# Patient Record
Sex: FEMALE | Race: WHITE | NOT HISPANIC OR LATINO | URBAN - METROPOLITAN AREA
[De-identification: names, ages, dates, MRNs, and addresses within clinical notes are randomized per-mention and may not be internally consistent; named-entity substitution may affect disease eponyms.]

---

## 2021-05-17 ENCOUNTER — OFFICE VISIT (OUTPATIENT)
Dept: URBAN - METROPOLITAN AREA CLINIC 60 | Facility: CLINIC | Age: 86
End: 2021-05-17

## 2021-06-17 ENCOUNTER — OFFICE VISIT (OUTPATIENT)
Dept: URBAN - METROPOLITAN AREA TELEHEALTH 2 | Facility: TELEHEALTH | Age: 86
End: 2021-06-17

## 2022-07-09 ENCOUNTER — TELEPHONE ENCOUNTER (OUTPATIENT)
Dept: URBAN - METROPOLITAN AREA CLINIC 121 | Facility: CLINIC | Age: 87
End: 2022-07-09

## 2022-07-09 RX ORDER — PROPAFENONE HYDROCHLORIDE 225 MG/1
TABLET, FILM COATED ORAL THREE TIMES A DAY
Refills: 0 | OUTPATIENT
Start: 2018-01-31 | End: 2019-12-10

## 2022-07-09 RX ORDER — PROPAFENONE HYDROCHLORIDE 225 MG/1
TABLET, FILM COATED ORAL THREE TIMES A DAY
Refills: 0 | OUTPATIENT
Start: 2014-01-16 | End: 2016-01-22

## 2022-07-09 RX ORDER — PROPAFENONE HYDROCHLORIDE 225 MG/1
TABLET, FILM COATED ORAL THREE TIMES A DAY
Refills: 0 | OUTPATIENT
Start: 2016-01-22 | End: 2016-02-25

## 2022-07-09 RX ORDER — LEVOTHYROXINE SODIUM 150 UG/1
TABLET ORAL
Refills: 0 | OUTPATIENT
Start: 2020-01-17 | End: 2020-01-17

## 2022-07-09 RX ORDER — PROPAFENONE HYDROCHLORIDE 225 MG/1
TABLET, FILM COATED ORAL THREE TIMES A DAY
Refills: 0 | OUTPATIENT
Start: 2020-02-27 | End: 2020-03-19

## 2022-07-09 RX ORDER — ASPIRIN 81 MG/1
TABLET, CHEWABLE ORAL
Refills: 0 | OUTPATIENT
Start: 2018-01-31 | End: 2020-01-17

## 2022-07-09 RX ORDER — FAMOTIDINE 40 MG/1
TABLET, FILM COATED ORAL
Refills: 0 | OUTPATIENT
Start: 2017-01-26 | End: 2017-12-13

## 2022-07-09 RX ORDER — IRBESARTAN 300 MG/1
TABLET ORAL ONCE A DAY
Refills: 0 | OUTPATIENT
Start: 2017-12-13 | End: 2018-01-31

## 2022-07-09 RX ORDER — LEVOTHYROXINE SODIUM 125 UG/1
TABLET ORAL ONCE A DAY
Refills: 0 | OUTPATIENT
Start: 2016-01-22 | End: 2016-02-25

## 2022-07-09 RX ORDER — AMLODIPINE BESYLATE 5 MG/1
TABLET ORAL ONCE A DAY
Refills: 0 | OUTPATIENT
Start: 2016-01-22 | End: 2016-02-25

## 2022-07-09 RX ORDER — LORAZEPAM 0.5 MG/1
PRN TABLET ORAL
Refills: 0 | OUTPATIENT
Start: 2020-01-17 | End: 2020-01-17

## 2022-07-09 RX ORDER — LEVOTHYROXINE SODIUM 137 UG/1
CAPSULE ORAL ONCE A DAY
Refills: 0 | OUTPATIENT
Start: 2020-02-27 | End: 2020-03-19

## 2022-07-09 RX ORDER — FAMOTIDINE 20 MG/1
TABLET ORAL
Refills: 0 | OUTPATIENT
Start: 2021-05-13 | End: 2021-05-17

## 2022-07-09 RX ORDER — ELECTROLYTES/DEXTROSE
SOLUTION, ORAL ORAL TWICE A DAY
Refills: 0 | OUTPATIENT
Start: 2018-01-31 | End: 2020-01-17

## 2022-07-09 RX ORDER — AMLODIPINE AND ATORVASTATIN 10; 10 MG/1; MG/1
TABLET, COATED ORAL
Refills: 0 | OUTPATIENT
Start: 2017-12-13 | End: 2018-01-31

## 2022-07-09 RX ORDER — LEVOTHYROXINE SODIUM 137 UG/1
CAPSULE ORAL
Refills: 0 | OUTPATIENT
Start: 2020-01-17 | End: 2020-02-27

## 2022-07-09 RX ORDER — FAMOTIDINE 40 MG/1
TABLET, FILM COATED ORAL
Refills: 0 | OUTPATIENT
Start: 2017-12-13 | End: 2018-01-31

## 2022-07-09 RX ORDER — LEVOTHYROXINE SODIUM 150 UG/1
TABLET ORAL
Refills: 0 | OUTPATIENT
Start: 2017-01-26 | End: 2017-12-01

## 2022-07-09 RX ORDER — FAMOTIDINE 20 MG
TABLET ORAL AS NEEDED
Refills: 0 | OUTPATIENT
Start: 2016-01-22 | End: 2016-02-25

## 2022-07-09 RX ORDER — AMLODIPINE BESYLATE 10 MG/1
TABLET ORAL ONCE A DAY
Refills: 0 | OUTPATIENT
Start: 2020-02-27 | End: 2020-03-19

## 2022-07-09 RX ORDER — CEPHALEXIN 500 MG/1
CAPSULE ORAL
Refills: 0 | OUTPATIENT
Start: 2021-05-13 | End: 2021-05-17

## 2022-07-09 RX ORDER — PROPAFENONE HYDROCHLORIDE 225 MG/1
TABLET, FILM COATED ORAL THREE TIMES A DAY
Refills: 0 | OUTPATIENT
Start: 2019-12-10 | End: 2020-01-17

## 2022-07-09 RX ORDER — FAMOTIDINE 40 MG/1
ONCE BEFORE DINNER TABLET, FILM COATED ORAL ONCE A DAY
Refills: 0 | OUTPATIENT
Start: 2017-01-26 | End: 2018-01-31

## 2022-07-09 RX ORDER — PROPAFENONE HYDROCHLORIDE 225 MG/1
TABLET, FILM COATED ORAL THREE TIMES A DAY
Refills: 0 | OUTPATIENT
Start: 2020-01-17 | End: 2020-02-27

## 2022-07-09 RX ORDER — MULTIVITAMIN
TABLET ORAL ONCE A DAY
Refills: 0 | OUTPATIENT
Start: 2016-01-22 | End: 2016-02-25

## 2022-07-09 RX ORDER — IRBESARTAN 300 MG/1
TABLET ORAL ONCE A DAY
Refills: 0 | OUTPATIENT
Start: 2016-01-22 | End: 2016-02-25

## 2022-07-09 RX ORDER — AMLODIPINE AND ATORVASTATIN 10; 10 MG/1; MG/1
TABLET, COATED ORAL
Refills: 0 | OUTPATIENT
Start: 2018-01-31 | End: 2019-12-10

## 2022-07-09 RX ORDER — LEVOTHYROXINE SODIUM 150 UG/1
TABLET ORAL
Refills: 0 | OUTPATIENT
Start: 2019-12-10 | End: 2020-01-17

## 2022-07-09 RX ORDER — OMEPRAZOLE 40 MG/1
TAKE ONE CAPSULE BY MOUTH ONCE A DAY CAPSULE, DELAYED RELEASE ORAL ONCE A DAY
Refills: 1 | OUTPATIENT
Start: 2020-01-17 | End: 2020-02-27

## 2022-07-09 RX ORDER — ASPIRIN 81 MG/1
TABLET, CHEWABLE ORAL
Refills: 0 | OUTPATIENT
Start: 2020-01-17 | End: 2020-02-27

## 2022-07-09 RX ORDER — METOPROLOL SUCCINATE 25 MG/1
TABLET, EXTENDED RELEASE ORAL
Refills: 0 | OUTPATIENT
Start: 2020-01-17 | End: 2020-01-17

## 2022-07-09 RX ORDER — LEVOTHYROXINE SODIUM 150 UG/1
TABLET ORAL
Refills: 0 | OUTPATIENT
Start: 2017-12-01 | End: 2018-01-31

## 2022-07-09 RX ORDER — AMLODIPINE BESYLATE 5 MG/1
TABLET ORAL ONCE A DAY
Refills: 0 | OUTPATIENT
Start: 2019-10-03 | End: 2020-01-17

## 2022-07-09 RX ORDER — LEVOTHYROXINE SODIUM 125 UG/1
TABLET ORAL ONCE A DAY
Refills: 0 | OUTPATIENT
Start: 2014-01-16 | End: 2016-01-22

## 2022-07-09 RX ORDER — PROPAFENONE HYDROCHLORIDE 225 MG/1
TABLET, FILM COATED ORAL THREE TIMES A DAY
Refills: 0 | OUTPATIENT
Start: 2020-03-19 | End: 2021-05-17

## 2022-07-09 RX ORDER — AMLODIPINE AND ATORVASTATIN 10; 10 MG/1; MG/1
TABLET, COATED ORAL
Refills: 0 | OUTPATIENT
Start: 2019-12-10 | End: 2020-01-17

## 2022-07-09 RX ORDER — AMLODIPINE BESYLATE 10 MG/1
TABLET ORAL ONCE A DAY
Refills: 0 | OUTPATIENT
Start: 2020-01-17 | End: 2020-02-27

## 2022-07-09 RX ORDER — FAMOTIDINE 20 MG
TABLET ORAL
Refills: 0 | OUTPATIENT
Start: 2018-01-31 | End: 2020-01-17

## 2022-07-09 RX ORDER — FAMOTIDINE 20 MG
ONCE A DAY TABLET ORAL ONCE A DAY
Refills: 1 | OUTPATIENT
Start: 2020-02-27 | End: 2020-04-23

## 2022-07-09 RX ORDER — AMLODIPINE AND ATORVASTATIN 10; 10 MG/1; MG/1
TABLET, COATED ORAL
Refills: 0 | OUTPATIENT
Start: 2017-01-26 | End: 2017-12-01

## 2022-07-09 RX ORDER — LEVOTHYROXINE SODIUM 150 UG/1
TABLET ORAL
Refills: 0 | OUTPATIENT
Start: 2018-01-31 | End: 2019-12-10

## 2022-07-09 RX ORDER — IRBESARTAN 300 MG/1
TABLET ORAL ONCE A DAY
Refills: 0 | OUTPATIENT
Start: 2018-01-31 | End: 2020-01-17

## 2022-07-09 RX ORDER — IRBESARTAN 300 MG/1
TABLET ORAL ONCE A DAY
Refills: 0 | OUTPATIENT
Start: 2020-01-17 | End: 2020-01-17

## 2022-07-09 RX ORDER — FAMOTIDINE 20 MG
TABLET ORAL AS NEEDED
Refills: 0 | OUTPATIENT
Start: 2014-01-16 | End: 2016-01-22

## 2022-07-09 RX ORDER — ASPIRIN 81 MG/1
TABLET, CHEWABLE ORAL
Refills: 0 | OUTPATIENT
Start: 2020-02-27 | End: 2020-03-19

## 2022-07-09 RX ORDER — FAMOTIDINE 20 MG/1
1 EVERY BEDTIME TABLET ORAL
Refills: 1 | OUTPATIENT
Start: 2021-05-17 | End: 2021-07-08

## 2022-07-09 RX ORDER — UBIDECARENONE/VIT E ACET 100MG-5
CAPSULE ORAL ONCE A DAY
Refills: 0 | OUTPATIENT
Start: 2016-01-22 | End: 2016-02-25

## 2022-07-09 RX ORDER — METOPROLOL SUCCINATE 25 MG/1
TABLET, EXTENDED RELEASE ORAL
Refills: 0 | OUTPATIENT
Start: 2019-12-10 | End: 2020-01-17

## 2022-07-09 RX ORDER — IRBESARTAN 300 MG/1
TABLET ORAL ONCE A DAY
Refills: 0 | OUTPATIENT
Start: 2017-12-01 | End: 2017-12-13

## 2022-07-09 RX ORDER — LORAZEPAM 0.5 MG/1
PRN TABLET ORAL
Refills: 0 | OUTPATIENT
Start: 2018-01-31 | End: 2020-01-17

## 2022-07-09 RX ORDER — AMLODIPINE BESYLATE 10 MG/1
TABLET ORAL ONCE A DAY
Refills: 0 | OUTPATIENT
Start: 2020-03-19 | End: 2021-05-17

## 2022-07-09 RX ORDER — FAMOTIDINE 20 MG
TABLET ORAL
Refills: 0 | OUTPATIENT
Start: 2020-01-17 | End: 2020-01-17

## 2022-07-09 RX ORDER — ASPIRIN 81 MG/1
TABLET, CHEWABLE ORAL
Refills: 0 | OUTPATIENT
Start: 2020-03-19 | End: 2021-05-17

## 2022-07-09 RX ORDER — ELECTROLYTES/DEXTROSE
SOLUTION, ORAL ORAL TWICE A DAY
Refills: 0 | OUTPATIENT
Start: 2020-01-17 | End: 2020-01-17

## 2022-07-09 RX ORDER — IRBESARTAN 300 MG/1
TABLET ORAL ONCE A DAY
Refills: 0 | OUTPATIENT
Start: 2014-01-16 | End: 2016-01-22

## 2022-07-09 RX ORDER — AMLODIPINE AND ATORVASTATIN 10; 10 MG/1; MG/1
TABLET, COATED ORAL
Refills: 0 | OUTPATIENT
Start: 2020-01-17 | End: 2020-01-17

## 2022-07-09 RX ORDER — AMLODIPINE AND ATORVASTATIN 10; 10 MG/1; MG/1
TABLET, COATED ORAL
Refills: 0 | OUTPATIENT
Start: 2017-12-01 | End: 2017-12-13

## 2022-07-09 RX ORDER — FAMOTIDINE 10 MG
TAKE ONE DAILY AS DIRECTED TABLET ORAL
Refills: 2 | OUTPATIENT
Start: 2017-12-01 | End: 2018-01-31

## 2022-07-10 ENCOUNTER — TELEPHONE ENCOUNTER (OUTPATIENT)
Dept: URBAN - METROPOLITAN AREA CLINIC 121 | Facility: CLINIC | Age: 87
End: 2022-07-10

## 2022-07-10 RX ORDER — ONDANSETRON HYDROCHLORIDE 4 MG/2ML
USE AS DIRECTED Q4-6 HOURS PRN INJECTION, SOLUTION INTRAMUSCULAR; INTRAVENOUS
Refills: 0 | Status: ACTIVE | COMMUNITY
Start: 2019-12-27

## 2022-07-10 RX ORDER — AVOBENZONE, HOMOSALATE, OCTISALATE, OCTOCRYLENE 30; 100; 50; 100 MG/ML; MG/ML; MG/ML; MG/ML
LOTION TOPICAL
Refills: 0 | Status: ACTIVE | COMMUNITY
Start: 2020-03-19

## 2022-07-10 RX ORDER — OMEPRAZOLE 20 MG/1
USE AS DIRECTED TAKE ONE CAPSULE PO ONCE DAILY 30 MINUTES BEFORE BREAKFAST CAPSULE, DELAYED RELEASE ORAL
Refills: 1 | Status: ACTIVE | COMMUNITY
Start: 2021-05-17

## 2022-07-10 RX ORDER — RIVAROXABAN 15 MG/1
TABLET, FILM COATED ORAL ONCE A DAY
Refills: 0 | Status: ACTIVE | COMMUNITY
Start: 2021-03-29

## 2022-07-10 RX ORDER — IRBESARTAN 300 MG/1
TABLET ORAL ONCE A DAY
Refills: 2 | Status: ACTIVE | COMMUNITY
Start: 2016-02-25

## 2022-07-10 RX ORDER — FAMOTIDINE 20 MG/1
1 EVERY BEDTIME TABLET ORAL
Refills: 1 | Status: ACTIVE | COMMUNITY
Start: 2021-07-08

## 2022-07-10 RX ORDER — FAMOTIDINE 20 MG
TABLET ORAL TWICE A DAY
Refills: 1 | Status: ACTIVE | COMMUNITY
Start: 2014-02-05

## 2022-07-10 RX ORDER — OMEPRAZOLE 20 MG/1
CAPSULE, DELAYED RELEASE ORAL TWICE A DAY
Refills: 0 | Status: ACTIVE | COMMUNITY
Start: 2014-01-23

## 2022-07-10 RX ORDER — PROPAFENONE HYDROCHLORIDE 225 MG/1
TABLET, FILM COATED ORAL THREE TIMES A DAY
Refills: 0 | Status: ACTIVE | COMMUNITY
Start: 2021-05-17

## 2022-07-10 RX ORDER — PROPAFENONE HYDROCHLORIDE 225 MG/1
TABLET, FILM COATED ORAL THREE TIMES A DAY
Refills: 2 | Status: ACTIVE | COMMUNITY
Start: 2016-02-25

## 2022-07-10 RX ORDER — OMEPRAZOLE 40 MG/1
CAPSULE, DELAYED RELEASE ORAL TWICE A DAY
Refills: 1 | Status: ACTIVE | COMMUNITY
Start: 2014-01-16

## 2022-07-10 RX ORDER — FAMOTIDINE 20 MG/1
TABLET ORAL TAKE AS DIRECTED
Refills: 0 | Status: ACTIVE | COMMUNITY
Start: 2021-05-17

## 2022-07-10 RX ORDER — OMEPRAZOLE 20 MG/1
ONCE A DAY CAPSULE, DELAYED RELEASE ORAL ONCE A DAY
Refills: 1 | Status: ACTIVE | COMMUNITY
Start: 2016-01-22

## 2022-07-10 RX ORDER — CEPHALEXIN 500 MG/1
CAPSULE ORAL TAKE AS DIRECTED
Refills: 0 | Status: ACTIVE | COMMUNITY
Start: 2021-05-17

## 2022-07-10 RX ORDER — FAMOTIDINE 20 MG
TAKE 1 TABLET BY MOUTH EVERY DAY TABLET ORAL
Refills: 1 | Status: ACTIVE | COMMUNITY
Start: 2020-04-23

## 2022-07-10 RX ORDER — RIFAXIMIN 550 MG/1
THREE TIMES A DAY PO X 2 WEEKS TABLET ORAL THREE TIMES A DAY
Refills: 1 | Status: ACTIVE | COMMUNITY
Start: 2020-01-17

## 2022-07-10 RX ORDER — AMLODIPINE BESYLATE 5 MG/1
TABLET ORAL ONCE A DAY
Refills: 2 | Status: ACTIVE | COMMUNITY
Start: 2016-02-25

## 2022-07-10 RX ORDER — UBIDECARENONE/VIT E ACET 100MG-5
CAPSULE ORAL ONCE A DAY
Refills: 2 | Status: ACTIVE | COMMUNITY
Start: 2016-02-25

## 2022-07-10 RX ORDER — MULTIVITAMIN
TABLET ORAL ONCE A DAY
Refills: 2 | Status: ACTIVE | COMMUNITY
Start: 2016-02-25

## 2022-07-10 RX ORDER — CHOLESTYRAMINE 4 G/9G
ONCE A DAY POWDER, FOR SUSPENSION ORAL ONCE A DAY
Refills: 1 | Status: ACTIVE | COMMUNITY
Start: 2020-03-10

## 2022-07-10 RX ORDER — LEVOTHYROXINE SODIUM 137 UG/1
CAPSULE ORAL ONCE A DAY
Refills: 0 | Status: ACTIVE | COMMUNITY
Start: 2020-03-19

## 2022-07-10 RX ORDER — AMLODIPINE BESYLATE 10 MG/1
TABLET ORAL ONCE A DAY
Refills: 0 | Status: ACTIVE | COMMUNITY
Start: 2021-05-17

## 2022-07-10 RX ORDER — LEVOTHYROXINE SODIUM 125 UG/1
TABLET ORAL ONCE A DAY
Refills: 2 | Status: ACTIVE | COMMUNITY
Start: 2016-02-25

## 2022-07-10 RX ORDER — PROPAFENONE HYDROCHLORIDE 225 MG/1
TABLET, FILM COATED ORAL THREE TIMES A DAY
Refills: 0 | Status: ACTIVE | COMMUNITY
Start: 2021-03-03

## 2022-07-10 RX ORDER — FAMOTIDINE 20 MG
TABLET ORAL AS NEEDED
Refills: 2 | Status: ACTIVE | COMMUNITY
Start: 2016-02-25

## 2022-08-05 ENCOUNTER — TELEPHONE ENCOUNTER (OUTPATIENT)
Dept: URBAN - METROPOLITAN AREA CLINIC 63 | Facility: CLINIC | Age: 87
End: 2022-08-05

## 2023-01-15 PROBLEM — 40739000: Status: ACTIVE | Noted: 2023-01-15

## 2023-01-15 PROBLEM — 266435005: Status: ACTIVE | Noted: 2023-01-15

## 2023-01-18 ENCOUNTER — OFFICE VISIT (OUTPATIENT)
Dept: URBAN - METROPOLITAN AREA CLINIC 63 | Facility: CLINIC | Age: 88
End: 2023-01-18

## 2023-01-18 NOTE — HPI-TODAY'S VISIT:
Arleth presented to the [facility] emergency department on 5/13/2021 with abdominal pain and nausea.  Located in the epigastric region.  Radiating to the left upper quadrant.  Worsen with prandial events.  Carafate with no significant improvement.  Labs dated 5/13/2021 showed a normal CBC.  Creatinine 1.25, GFR 40.8.  Normal LFTs.  Chest x-ray showed minimal left basilar atelectasis.  CT abdomen/pelvis with contrast dated 5/13/2021 showed fecalized material in the distal ileum with mildly distended bowel loops suggesting hypomotility/stasis.  No overt evidence of obstruction.  No acute inflammatory changes.  Renal cortical atrophy and scarring.    Patient was given Pepcid Zofran and IV fluids with improvement.    Previously seen for history of chronic diarrhea - resolved with Xifaxan    EGD dated 9/12/2019 demonstrated a 1 cm hiatal hernia, irregular Z line, dilation to 18 mm with no evidence of strictures.  Mild antral gastritis.  Gastric mucosa demonstrated mild chronic inflammation, vascular congestion and foveolar hyperplasia.  Favor reactive/chemical gastritis but negative for H. pylori, metaplasia, dysplasia or neoplasia.  Distal esophageal biopsies demonstrated gastric type mucosa with mild chronic inflammation negative for goblet cells and negative for dysplasia.  Esophageal squamous mucosa showing reactive epithelial changes consistent with reflux    Colonoscopy dated 1/3/2020 demonstrated one diminutive mild hyperplastic polyp removed from the cecum.  One diminutive hyperplastic polyp was removed the mid transverse colon.  The entire examined colon appeared unremarkable but biopsied for histology.  Internal hemorrhoids were present upon retroflexion. Negative random colon biopsies.     MRI of the abdomen/pelvis dated 4/7/2020 demonstrated no suspicious masses identified in the abdomen.  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  Status post cholecystectomy and status post hysterectomy.  No pelvis mass is identified.    Currently taking famotidine 20 mg bid with minimal improvement. Epigastric pain even when consuming water with subsequent nausea. Notes 1-2 daily bowel movements with pushing and straining. Denies any vomiting, dysphagia, odynophagia, hematemesis, abnormal weight loss, reflux, heartburn, BRBPR or melena. Denies any family history of colon cancer but admits to maternal history colon polyps. Notes no other GI complaints at this time

## 2023-01-31 ENCOUNTER — CLAIMS CREATED FROM THE CLAIM WINDOW (OUTPATIENT)
Dept: URBAN - METROPOLITAN AREA CLINIC 63 | Facility: CLINIC | Age: 88
End: 2023-01-31
Payer: MEDICARE

## 2023-01-31 ENCOUNTER — LAB OUTSIDE AN ENCOUNTER (OUTPATIENT)
Dept: URBAN - METROPOLITAN AREA CLINIC 63 | Facility: CLINIC | Age: 88
End: 2023-01-31

## 2023-01-31 ENCOUNTER — WEB ENCOUNTER (OUTPATIENT)
Dept: URBAN - METROPOLITAN AREA CLINIC 63 | Facility: CLINIC | Age: 88
End: 2023-01-31

## 2023-01-31 VITALS
TEMPERATURE: 97.7 F | DIASTOLIC BLOOD PRESSURE: 64 MMHG | OXYGEN SATURATION: 94 % | WEIGHT: 143.6 LBS | HEART RATE: 57 BPM | BODY MASS INDEX: 25.45 KG/M2 | HEIGHT: 63 IN | SYSTOLIC BLOOD PRESSURE: 130 MMHG

## 2023-01-31 DIAGNOSIS — K59.09 OTHER CONSTIPATION: ICD-10-CM

## 2023-01-31 DIAGNOSIS — R14.0 ABDOMINAL DISTENTION: ICD-10-CM

## 2023-01-31 DIAGNOSIS — Z83.71 FAMILY HISTORY OF COLONIC POLYPS: ICD-10-CM

## 2023-01-31 PROCEDURE — 99214 OFFICE O/P EST MOD 30 MIN: CPT | Performed by: PHYSICIAN ASSISTANT

## 2023-01-31 RX ORDER — AVOBENZONE, HOMOSALATE, OCTISALATE, OCTOCRYLENE 30; 100; 50; 100 MG/ML; MG/ML; MG/ML; MG/ML
LOTION TOPICAL
Refills: 0 | Status: ACTIVE | COMMUNITY
Start: 2020-03-19

## 2023-01-31 RX ORDER — LEVOTHYROXINE SODIUM 137 UG/1
CAPSULE ORAL ONCE A DAY
Refills: 0 | Status: ACTIVE | COMMUNITY
Start: 2020-03-19

## 2023-01-31 RX ORDER — AMLODIPINE BESYLATE 10 MG/1
TABLET ORAL ONCE A DAY
Refills: 0 | Status: ACTIVE | COMMUNITY
Start: 2021-05-17

## 2023-01-31 RX ORDER — RIVAROXABAN 15 MG/1
TABLET, FILM COATED ORAL ONCE A DAY
Refills: 0 | Status: ACTIVE | COMMUNITY
Start: 2021-03-29

## 2023-01-31 RX ORDER — MULTIVITAMIN
TABLET ORAL ONCE A DAY
Refills: 2 | Status: ACTIVE | COMMUNITY
Start: 2016-02-25

## 2023-01-31 RX ORDER — PROPAFENONE HYDROCHLORIDE 225 MG/1
TABLET, FILM COATED ORAL THREE TIMES A DAY
Refills: 0 | Status: ACTIVE | COMMUNITY
Start: 2021-03-03

## 2023-01-31 NOTE — HPI-TODAY'S VISIT:
Arleth is a pleasant 87 year old female who presents today for follow up.   CT abdomen/pelvis with contrast dated 5/13/2021 showed fecalized material in the distal ileum with mildly distended bowel loops suggesting hypomotility/stasis.  No overt evidence of obstruction.  No acute inflammatory changes.  Renal cortical atrophy and scarring.     EGD dated 9/12/2019 demonstrated a 1 cm hiatal hernia, irregular Z line, dilation to 18 mm with no evidence of strictures.  Mild antral gastritis.  Gastric mucosa demonstrated mild chronic inflammation, vascular congestion and foveolar hyperplasia.  Favor reactive/chemical gastritis but negative for H. pylori, metaplasia, dysplasia or neoplasia.  Distal esophageal biopsies demonstrated gastric type mucosa with mild chronic inflammation negative for goblet cells and negative for dysplasia.  Esophageal squamous mucosa showing reactive epithelial changes consistent with reflux    Colonoscopy dated 1/3/2020 demonstrated one diminutive mild hyperplastic polyp removed from the cecum.  One diminutive hyperplastic polyp was removed the mid transverse colon.  The entire examined colon appeared unremarkable but biopsied for histology.  Internal hemorrhoids were present upon retroflexion. Negative random colon biopsies.     MRI of the abdomen/pelvis dated 4/7/2020 demonstrated no suspicious masses identified in the abdomen.  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  Status post cholecystectomy and status post hysterectomy.  No pelvis mass is identified.   Barium swallow dated 5/27/2021 showed mild esophageal dysmotility.  No esophageal mass, hiatal hernia or gastroesophageal reflux disease.  Normal stomach without gastritis  Labs dated 11/25/2022 showed a normal hemoglobin.  Platelets normal.  Creatinine normal.  GFR 55.  TSH normal.  Patient presented to the ER on 1/15/2023 with dyspnea on exertion.  Evaluated by cardiology.  History of A. fib currently on Xarelto.  Cardiac work-up negative.  Likely postviral illness.  If symptoms persist can consider further testing.  Labs dated 1/18/2023 showed hemoglobin 11.1, hematocrit 33.6.  Platelets normal.  Hemoglobin normal upon admission.  Creatinine 1.26, GFR 41.9.  Normal LFTs.  Over the last 2-3 months, having diarrhea once per week. Seems to be consistent with overflow diarrhea. No further episodes over the last two weeks. She notes abdominal distention worse after breakfast. The other days she is having small, incomplete bowel movements. Minimal efficacy with TUMs. Denies any nausea, vomiting, heartburn, reflux, dysphagia, odynophagia, hematemesis, coffee ground emesis, abdominal pain, abnormal weight loss, BRBPR or melena. Denies any family history of colon cancer but admits to maternal history colon polyps. Notes no other GI complaints at this time

## 2023-02-06 ENCOUNTER — TELEPHONE ENCOUNTER (OUTPATIENT)
Dept: URBAN - METROPOLITAN AREA CLINIC 63 | Facility: CLINIC | Age: 88
End: 2023-02-06

## 2023-02-13 ENCOUNTER — TELEPHONE ENCOUNTER (OUTPATIENT)
Dept: URBAN - METROPOLITAN AREA CLINIC 63 | Facility: CLINIC | Age: 88
End: 2023-02-13

## 2023-02-15 ENCOUNTER — LAB OUTSIDE AN ENCOUNTER (OUTPATIENT)
Dept: URBAN - METROPOLITAN AREA CLINIC 63 | Facility: CLINIC | Age: 88
End: 2023-02-15

## 2023-02-22 LAB
A/G RATIO: 2
ALBUMIN: 4
ALKALINE PHOSPHATASE: 61
ALT (SGPT): 12
AST (SGOT): 13
BILIRUBIN, TOTAL: 0.4
BUN/CREATININE RATIO: 11
BUN: 16
CALCIUM: 9.4
CALPROTECTIN, FECAL: 41
CAMPYLOBACTER SPP. AG,EIA: (no result)
CARBON DIOXIDE, TOTAL: 27
CHLORIDE: 103
CLOSTRIDIUM DIFFICILE: (no result)
CREATININE: 1.48
CRYPTOSPORIDIUM ANTIGEN,: (no result)
EGFR: 34
GIARDIA AG, EIA, STOOL: (no result)
GLOBULIN, TOTAL: 2
GLUCOSE: 92
LACTOFERRIN, FECAL, QUANT.: <6.25
LEUKOCYTES: (no result)
OVA AND PARASITES, CONC AND PERM SMEAR: (no result)
POTASSIUM: 4
PROTEIN, TOTAL: 6
SALMONELLA AND SHIGELLA, CULTURE: (no result)
SHIGA TOXINS, EIA W/RFL TO E.COLI O157 CULTURE: (no result)
SODIUM: 140

## 2023-02-24 ENCOUNTER — TELEPHONE ENCOUNTER (OUTPATIENT)
Dept: URBAN - METROPOLITAN AREA CLINIC 7 | Facility: CLINIC | Age: 88
End: 2023-02-24

## 2023-02-24 ENCOUNTER — TELEPHONE ENCOUNTER (OUTPATIENT)
Dept: URBAN - METROPOLITAN AREA CLINIC 63 | Facility: CLINIC | Age: 88
End: 2023-02-24

## 2023-02-28 ENCOUNTER — OFFICE VISIT (OUTPATIENT)
Dept: URBAN - METROPOLITAN AREA CLINIC 63 | Facility: CLINIC | Age: 88
End: 2023-02-28
Payer: MEDICARE

## 2023-02-28 ENCOUNTER — TELEPHONE ENCOUNTER (OUTPATIENT)
Dept: URBAN - METROPOLITAN AREA CLINIC 63 | Facility: CLINIC | Age: 88
End: 2023-02-28

## 2023-02-28 VITALS
DIASTOLIC BLOOD PRESSURE: 64 MMHG | HEART RATE: 61 BPM | BODY MASS INDEX: 24.45 KG/M2 | OXYGEN SATURATION: 99 % | SYSTOLIC BLOOD PRESSURE: 120 MMHG | WEIGHT: 138 LBS | HEIGHT: 63 IN | TEMPERATURE: 98.6 F

## 2023-02-28 DIAGNOSIS — K58.0 IRRITABLE BOWEL SYNDROME WITH DIARRHEA: ICD-10-CM

## 2023-02-28 DIAGNOSIS — R14.0 ABDOMINAL DISTENTION: ICD-10-CM

## 2023-02-28 DIAGNOSIS — Z83.71 FAMILY HISTORY OF COLONIC POLYPS: ICD-10-CM

## 2023-02-28 PROBLEM — 197125005: Status: ACTIVE | Noted: 2023-02-28

## 2023-02-28 PROCEDURE — 99214 OFFICE O/P EST MOD 30 MIN: CPT | Performed by: PHYSICIAN ASSISTANT

## 2023-02-28 RX ORDER — RIFAXIMIN 550 MG/1
1 TABLET TABLET ORAL THREE TIMES A DAY
Qty: 42 TABLET | Refills: 2 | OUTPATIENT

## 2023-02-28 RX ORDER — PROPAFENONE HYDROCHLORIDE 225 MG/1
TABLET, FILM COATED ORAL THREE TIMES A DAY
Refills: 0 | Status: ACTIVE | COMMUNITY
Start: 2021-03-03

## 2023-02-28 RX ORDER — MULTIVITAMIN
TABLET ORAL ONCE A DAY
Refills: 2 | Status: ACTIVE | COMMUNITY
Start: 2016-02-25

## 2023-02-28 RX ORDER — AMLODIPINE BESYLATE 10 MG/1
TABLET ORAL ONCE A DAY
Refills: 0 | Status: ACTIVE | COMMUNITY
Start: 2021-05-17

## 2023-02-28 RX ORDER — RIVAROXABAN 15 MG/1
TABLET, FILM COATED ORAL ONCE A DAY
Refills: 0 | Status: ACTIVE | COMMUNITY
Start: 2021-03-29

## 2023-02-28 RX ORDER — LEVOTHYROXINE SODIUM 137 UG/1
CAPSULE ORAL ONCE A DAY
Refills: 0 | Status: ACTIVE | COMMUNITY
Start: 2020-03-19

## 2023-02-28 RX ORDER — AVOBENZONE, HOMOSALATE, OCTISALATE, OCTOCRYLENE 30; 100; 50; 100 MG/ML; MG/ML; MG/ML; MG/ML
LOTION TOPICAL
Refills: 0 | Status: ACTIVE | COMMUNITY
Start: 2020-03-19

## 2023-02-28 NOTE — HPI-TODAY'S VISIT:
Arleth is a pleasant 87 year old female who presents today for follow up.  History of A. fib currently on Xarelto.    CT abdomen/pelvis without contrast dated 2/3/2023 showed no acute abnormality in the abdomen or pelvis on noncontrast CT  Labs/stool studies dated 2/22/2023 showed negative stool studies.  Creatinine 1.48.  GFR 34.  Normal LFTs.  CT abdomen/pelvis with contrast dated 5/13/2021 showed fecalized material in the distal ileum with mildly distended bowel loops suggesting hypomotility/stasis.  No overt evidence of obstruction.  No acute inflammatory changes.  Renal cortical atrophy and scarring.     EGD dated 9/12/2019 demonstrated a 1 cm hiatal hernia, irregular Z line, dilation to 18 mm with no evidence of strictures.  Mild antral gastritis.  Gastric mucosa demonstrated mild chronic inflammation, vascular congestion and foveolar hyperplasia.  Favor reactive/chemical gastritis but negative for H. pylori, metaplasia, dysplasia or neoplasia.  Distal esophageal biopsies demonstrated gastric type mucosa with mild chronic inflammation negative for goblet cells and negative for dysplasia.  Esophageal squamous mucosa showing reactive epithelial changes consistent with reflux    Colonoscopy dated 1/3/2020 demonstrated one diminutive mild hyperplastic polyp removed from the cecum.  One diminutive hyperplastic polyp was removed the mid transverse colon.  The entire examined colon appeared unremarkable but biopsied for histology.  Internal hemorrhoids were present upon retroflexion. Negative random colon biopsies.     MRI of the abdomen/pelvis dated 4/7/2020 demonstrated no suspicious masses identified in the abdomen.  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  Status post cholecystectomy and status post hysterectomy.  No pelvis mass is identified.   Barium swallow dated 5/27/2021 showed mild esophageal dysmotility.  No esophageal mass, hiatal hernia or gastroesophageal reflux disease.  Normal stomach without gastritis  Labs dated 11/25/2022 showed a normal hemoglobin.  Platelets normal.  Creatinine normal.  GFR 55.  TSH normal.  Labs dated 1/18/2023 showed hemoglobin 11.1, hematocrit 33.6.  Platelets normal.  Hemoglobin normal upon admission.  Creatinine 1.26, GFR 41.9.  Normal LFTs.  Last visit noting intermittent diarrhea, once per week. Seems to be consistent with overflow diarrhea. Noted abdominal distention worse after breakfast. The other days she was having small, incomplete bowel movements. At today's visit she notes increased episodes of diarrhea. However, she takes an imodium every few days without a BM in between. She previously was treated for IBS-D with Xifaxan and resolution of her symptoms a few years back. Her episodes of diarrhea start off with a soft, but difficult stool to pass - followed by voluminous watery stool (occuring every few days). Denies any nausea, vomiting, heartburn, reflux, dysphagia, odynophagia, hematemesis, coffee ground emesis, abdominal pain, abnormal weight loss, BRBPR or melena. Denies any family history of colon cancer but admits to maternal history colon polyps. Notes no other GI complaints at this time

## 2023-03-15 ENCOUNTER — OFFICE VISIT (OUTPATIENT)
Dept: URBAN - METROPOLITAN AREA CLINIC 63 | Facility: CLINIC | Age: 88
End: 2023-03-15

## 2023-03-28 ENCOUNTER — OFFICE VISIT (OUTPATIENT)
Dept: URBAN - METROPOLITAN AREA CLINIC 63 | Facility: CLINIC | Age: 88
End: 2023-03-28
Payer: MEDICARE

## 2023-03-28 ENCOUNTER — WEB ENCOUNTER (OUTPATIENT)
Dept: URBAN - METROPOLITAN AREA CLINIC 63 | Facility: CLINIC | Age: 88
End: 2023-03-28

## 2023-03-28 VITALS
HEART RATE: 67 BPM | OXYGEN SATURATION: 96 % | HEIGHT: 63 IN | SYSTOLIC BLOOD PRESSURE: 118 MMHG | WEIGHT: 139.6 LBS | TEMPERATURE: 97.1 F | DIASTOLIC BLOOD PRESSURE: 72 MMHG | BODY MASS INDEX: 24.73 KG/M2

## 2023-03-28 DIAGNOSIS — K58.0 IRRITABLE BOWEL SYNDROME WITH DIARRHEA: ICD-10-CM

## 2023-03-28 DIAGNOSIS — Z83.71 FAMILY HISTORY OF COLONIC POLYPS: ICD-10-CM

## 2023-03-28 PROCEDURE — 99213 OFFICE O/P EST LOW 20 MIN: CPT | Performed by: PHYSICIAN ASSISTANT

## 2023-03-28 RX ORDER — LEVOTHYROXINE SODIUM 137 UG/1
CAPSULE ORAL ONCE A DAY
Refills: 0 | Status: ACTIVE | COMMUNITY
Start: 2020-03-19

## 2023-03-28 RX ORDER — AMLODIPINE BESYLATE 10 MG/1
TABLET ORAL ONCE A DAY
Refills: 0 | Status: ACTIVE | COMMUNITY
Start: 2021-05-17

## 2023-03-28 RX ORDER — PROPAFENONE HYDROCHLORIDE 225 MG/1
TABLET, FILM COATED ORAL THREE TIMES A DAY
Refills: 0 | Status: ACTIVE | COMMUNITY
Start: 2021-03-03

## 2023-03-28 RX ORDER — MULTIVITAMIN
TABLET ORAL ONCE A DAY
Refills: 2 | Status: ACTIVE | COMMUNITY
Start: 2016-02-25

## 2023-03-28 RX ORDER — RIVAROXABAN 15 MG/1
TABLET, FILM COATED ORAL ONCE A DAY
Refills: 0 | Status: ACTIVE | COMMUNITY
Start: 2021-03-29

## 2023-03-28 NOTE — HPI-TODAY'S VISIT:
Arleth is a pleasant 87 year old female who presents today for follow up.  History of A. fib currently on Xarelto.    CT abdomen/pelvis without contrast dated 2/3/2023 showed no acute abnormality in the abdomen or pelvis on noncontrast CT  Labs/stool studies dated 2/22/2023 showed negative stool studies.  Creatinine 1.48.  GFR 34.  Normal LFTs.  CT abdomen/pelvis with contrast dated 5/13/2021 showed fecalized material in the distal ileum with mildly distended bowel loops suggesting hypomotility/stasis.  No overt evidence of obstruction.  No acute inflammatory changes.  Renal cortical atrophy and scarring.     EGD dated 9/12/2019 demonstrated a 1 cm hiatal hernia, irregular Z line, dilation to 18 mm with no evidence of strictures.  Mild antral gastritis.  Gastric mucosa demonstrated mild chronic inflammation, vascular congestion and foveolar hyperplasia.  Favor reactive/chemical gastritis but negative for H. pylori, metaplasia, dysplasia or neoplasia.  Distal esophageal biopsies demonstrated gastric type mucosa with mild chronic inflammation negative for goblet cells and negative for dysplasia.  Esophageal squamous mucosa showing reactive epithelial changes consistent with reflux    Colonoscopy dated 1/3/2020 demonstrated one diminutive mild hyperplastic polyp removed from the cecum.  One diminutive hyperplastic polyp was removed the mid transverse colon.  The entire examined colon appeared unremarkable but biopsied for histology.  Internal hemorrhoids were present upon retroflexion. Negative random colon biopsies.     MRI of the abdomen/pelvis dated 4/7/2020 demonstrated no suspicious masses identified in the abdomen.  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  Status post cholecystectomy and status post hysterectomy.  No pelvis mass is identified.   Barium swallow dated 5/27/2021 showed mild esophageal dysmotility.  No esophageal mass, hiatal hernia or gastroesophageal reflux disease.  Normal stomach without gastritis  Labs dated 11/25/2022 showed a normal hemoglobin.  Platelets normal.  Creatinine normal.  GFR 55.  TSH normal.  Labs dated 1/18/2023 showed hemoglobin 11.1, hematocrit 33.6.  Platelets normal.  Hemoglobin normal upon admission.  Creatinine 1.26, GFR 41.9.  Normal LFTs.  Last visit was given trial of Xifaxan but she could not afford. Recommend peptobismol tablets instead of imodium. This was ineffective. Last visit noted increased episodes of diarrhea. However, she was taking an imodium every few days without a BM in between. She previously was treated for IBS-D with Xifaxan and resolution of her symptoms a few years back. Her episodes of diarrhea start off with a soft, but difficult stool to pass - followed by voluminous watery stool (occuring every few days). She notes since last visit improvement overall in her symptoms. She has only had to use imodium a few times since then. Episodes of normal bowel movements as well. Diarrhea is intermittent. Denies any nausea, vomiting, heartburn, reflux, dysphagia, odynophagia, hematemesis, coffee ground emesis, abdominal pain, abnormal weight loss, BRBPR or melena. Denies any family history of colon cancer but admits to maternal history colon polyps. Notes no other GI complaints at this time

## 2023-04-24 ENCOUNTER — TELEPHONE ENCOUNTER (OUTPATIENT)
Dept: URBAN - METROPOLITAN AREA CLINIC 63 | Facility: CLINIC | Age: 88
End: 2023-04-24

## 2023-04-28 ENCOUNTER — OFFICE VISIT (OUTPATIENT)
Dept: URBAN - METROPOLITAN AREA CLINIC 63 | Facility: CLINIC | Age: 88
End: 2023-04-28

## 2023-04-29 ENCOUNTER — TELEPHONE ENCOUNTER (OUTPATIENT)
Dept: URBAN - METROPOLITAN AREA CLINIC 63 | Facility: CLINIC | Age: 88
End: 2023-04-29

## 2023-05-12 ENCOUNTER — OFFICE VISIT (OUTPATIENT)
Dept: URBAN - METROPOLITAN AREA CLINIC 63 | Facility: CLINIC | Age: 88
End: 2023-05-12
Payer: MEDICARE

## 2023-05-12 VITALS
WEIGHT: 140.2 LBS | BODY MASS INDEX: 24.84 KG/M2 | TEMPERATURE: 97.3 F | DIASTOLIC BLOOD PRESSURE: 70 MMHG | SYSTOLIC BLOOD PRESSURE: 110 MMHG | OXYGEN SATURATION: 94 % | HEART RATE: 65 BPM | HEIGHT: 63 IN

## 2023-05-12 DIAGNOSIS — R15.9 FULL INCONTINENCE OF FECES: ICD-10-CM

## 2023-05-12 DIAGNOSIS — K58.0 IRRITABLE BOWEL SYNDROME WITH DIARRHEA: ICD-10-CM

## 2023-05-12 PROBLEM — 1086911000119107: Status: ACTIVE | Noted: 2023-05-12

## 2023-05-12 PROCEDURE — 99213 OFFICE O/P EST LOW 20 MIN: CPT | Performed by: PHYSICIAN ASSISTANT

## 2023-05-12 RX ORDER — MULTIVITAMIN
TABLET ORAL ONCE A DAY
Refills: 2 | Status: ACTIVE | COMMUNITY
Start: 2016-02-25

## 2023-05-12 RX ORDER — RIVAROXABAN 15 MG/1
TABLET, FILM COATED ORAL ONCE A DAY
Refills: 0 | Status: ACTIVE | COMMUNITY
Start: 2021-03-29

## 2023-05-12 RX ORDER — LEVOTHYROXINE SODIUM 137 UG/1
CAPSULE ORAL ONCE A DAY
Refills: 0 | Status: ACTIVE | COMMUNITY
Start: 2020-03-19

## 2023-05-12 RX ORDER — PROPAFENONE HYDROCHLORIDE 225 MG/1
TABLET, FILM COATED ORAL THREE TIMES A DAY
Refills: 0 | Status: ACTIVE | COMMUNITY
Start: 2021-03-03

## 2023-05-12 RX ORDER — AMLODIPINE BESYLATE 10 MG/1
TABLET ORAL ONCE A DAY
Refills: 0 | Status: ACTIVE | COMMUNITY
Start: 2021-05-17

## 2023-11-29 ENCOUNTER — TELEPHONE ENCOUNTER (OUTPATIENT)
Dept: URBAN - METROPOLITAN AREA CLINIC 63 | Facility: CLINIC | Age: 88
End: 2023-11-29

## 2023-11-30 ENCOUNTER — OFFICE VISIT (OUTPATIENT)
Dept: URBAN - METROPOLITAN AREA CLINIC 63 | Facility: CLINIC | Age: 88
End: 2023-11-30
Payer: MEDICARE

## 2023-11-30 VITALS
DIASTOLIC BLOOD PRESSURE: 70 MMHG | TEMPERATURE: 97.8 F | WEIGHT: 140 LBS | HEIGHT: 63 IN | BODY MASS INDEX: 24.8 KG/M2 | SYSTOLIC BLOOD PRESSURE: 110 MMHG | HEART RATE: 75 BPM

## 2023-11-30 DIAGNOSIS — K58.0 IRRITABLE BOWEL SYNDROME WITH DIARRHEA: ICD-10-CM

## 2023-11-30 DIAGNOSIS — R15.9 FULL INCONTINENCE OF FECES: ICD-10-CM

## 2023-11-30 PROCEDURE — 99214 OFFICE O/P EST MOD 30 MIN: CPT | Performed by: PHYSICIAN ASSISTANT

## 2023-11-30 RX ORDER — RIFAXIMIN 550 MG/1
1 TABLET TABLET ORAL THREE TIMES A DAY
Qty: 42 TABLET | Refills: 0 | OUTPATIENT
Start: 2023-11-30 | End: 2023-12-14

## 2023-11-30 RX ORDER — PROPAFENONE HYDROCHLORIDE 225 MG/1
TABLET, FILM COATED ORAL THREE TIMES A DAY
Refills: 0 | Status: DISCONTINUED | COMMUNITY
Start: 2021-03-03

## 2023-11-30 RX ORDER — COLESTIPOL HYDROCHLORIDE 1 G/1
1 TABLET AS NEEDED TABLET, FILM COATED ORAL ONCE A DAY
Qty: 30 TABLET | Refills: 1 | OUTPATIENT
Start: 2023-11-30

## 2023-11-30 RX ORDER — MULTIVITAMIN
TABLET ORAL ONCE A DAY
Refills: 2 | Status: ACTIVE | COMMUNITY
Start: 2016-02-25

## 2023-11-30 RX ORDER — RIVAROXABAN 15 MG/1
TABLET, FILM COATED ORAL ONCE A DAY
Refills: 0 | Status: ACTIVE | COMMUNITY
Start: 2021-03-29

## 2023-11-30 RX ORDER — LEVOTHYROXINE SODIUM 137 UG/1
CAPSULE ORAL ONCE A DAY
Refills: 0 | Status: ACTIVE | COMMUNITY
Start: 2020-03-19

## 2023-11-30 RX ORDER — AMLODIPINE BESYLATE 10 MG/1
TABLET ORAL ONCE A DAY
Refills: 0 | Status: ACTIVE | COMMUNITY
Start: 2021-05-17

## 2023-11-30 NOTE — HPI-TODAY'S VISIT:
Arleth is a pleasant 88 year old female who presents today for a follow up.  History of A. fib currently on Xarelto.    Labs dated 9/21/2023 showed a normal hemoglobin.  Normal platelets.  Creatinine 1.11, GFR 48.5.  Normal LFTs.  Ferritin normal.  Total iron normal.  CT abdomen/pelvis without contrast dated 2/3/2023 showed no acute abnormality in the abdomen or pelvis on noncontrast CT  Labs/stool studies dated 2/22/2023 showed negative stool studies.  Creatinine 1.48.  GFR 34.  Normal LFTs.  CT abdomen/pelvis with contrast dated 5/13/2021 showed fecalized material in the distal ileum with mildly distended bowel loops suggesting hypomotility/stasis.  No overt evidence of obstruction.  No acute inflammatory changes.  Renal cortical atrophy and scarring.     EGD dated 9/12/2019 demonstrated a 1 cm hiatal hernia, irregular Z line, dilation to 18 mm with no evidence of strictures.  Mild antral gastritis.  Gastric mucosa demonstrated mild chronic inflammation, vascular congestion and foveolar hyperplasia.  Favor reactive/chemical gastritis but negative for H. pylori, metaplasia, dysplasia or neoplasia.  Distal esophageal biopsies demonstrated gastric type mucosa with mild chronic inflammation negative for goblet cells and negative for dysplasia.  Esophageal squamous mucosa showing reactive epithelial changes consistent with reflux    Colonoscopy dated 1/3/2020 demonstrated one diminutive mild hyperplastic polyp removed from the cecum.  One diminutive hyperplastic polyp was removed the mid transverse colon.  The entire examined colon appeared unremarkable but biopsied for histology.  Internal hemorrhoids were present upon retroflexion. Negative random colon biopsies.     MRI of the abdomen/pelvis dated 4/7/2020 demonstrated no suspicious masses identified in the abdomen.  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  Status post cholecystectomy and status post hysterectomy.  No pelvis mass is identified.   Barium swallow dated 5/27/2021 showed mild esophageal dysmotility.  No esophageal mass, hiatal hernia or gastroesophageal reflux disease.  Normal stomach without gastritis  Labs dated 11/25/2022 showed a normal hemoglobin.  Platelets normal.  Creatinine normal.  GFR 55.  TSH normal.  Previously given trial of Xifaxan and noted improvement overall in her symptoms.  She states over the last two days had episodes of watery diarrhea, improved with imodium. Now stool is soft formed but episodes of fecal incontinence. She never went to go see [facility] for biofeedback therapy. Over the last 6 months has been taking imodium approx once every couple of weeks. Denies any nausea, vomiting, heartburn, reflux, dysphagia, odynophagia, hematemesis, coffee ground emesis, abdominal pain, abnormal weight loss, BRBPR or melena. Denies any family history of colon cancer but admits to maternal history colon polyps. Notes no other GI complaints at this time

## 2023-12-04 ENCOUNTER — TELEPHONE ENCOUNTER (OUTPATIENT)
Dept: URBAN - METROPOLITAN AREA CLINIC 63 | Facility: CLINIC | Age: 88
End: 2023-12-04

## 2024-01-02 ENCOUNTER — ERX REFILL RESPONSE (OUTPATIENT)
Dept: URBAN - METROPOLITAN AREA CLINIC 63 | Facility: CLINIC | Age: 89
End: 2024-01-02

## 2024-01-02 RX ORDER — COLESTIPOL HYDROCHLORIDE 1 G/1
1 TABLET AS NEEDED TABLET, FILM COATED ORAL ONCE A DAY
Qty: 30 TABLET | Refills: 1 | OUTPATIENT

## 2024-01-02 RX ORDER — COLESTIPOL HYDROCHLORIDE 1 G/1
TAKE 1 TABLET BY MOUTH EVERY DAY AS NEEDED FOR 30 DAYS TABLET, FILM COATED ORAL ONCE A DAY
Qty: 90 | Refills: 1 | OUTPATIENT

## 2024-01-16 ENCOUNTER — OFFICE VISIT (OUTPATIENT)
Dept: URBAN - METROPOLITAN AREA CLINIC 63 | Facility: CLINIC | Age: 89
End: 2024-01-16
Payer: MEDICARE

## 2024-01-16 ENCOUNTER — DASHBOARD ENCOUNTERS (OUTPATIENT)
Age: 89
End: 2024-01-16

## 2024-01-16 VITALS
TEMPERATURE: 96.1 F | DIASTOLIC BLOOD PRESSURE: 56 MMHG | HEART RATE: 69 BPM | WEIGHT: 139 LBS | SYSTOLIC BLOOD PRESSURE: 118 MMHG | HEIGHT: 63 IN | BODY MASS INDEX: 24.63 KG/M2 | OXYGEN SATURATION: 98 %

## 2024-01-16 DIAGNOSIS — K58.0 IRRITABLE BOWEL SYNDROME WITH DIARRHEA: ICD-10-CM

## 2024-01-16 DIAGNOSIS — R15.9 FULL INCONTINENCE OF FECES: ICD-10-CM

## 2024-01-16 DIAGNOSIS — Z83.719 FAMILY HISTORY OF COLON POLYPS, UNSPECIFIED: ICD-10-CM

## 2024-01-16 PROCEDURE — 99213 OFFICE O/P EST LOW 20 MIN: CPT | Performed by: PHYSICIAN ASSISTANT

## 2024-01-16 RX ORDER — AMLODIPINE BESYLATE 10 MG/1
TABLET ORAL ONCE A DAY
Refills: 0 | COMMUNITY
Start: 2021-05-17

## 2024-01-16 RX ORDER — COLESTIPOL HYDROCHLORIDE 1 G/1
1 TABLET AS NEEDED TABLET, FILM COATED ORAL ONCE A DAY
Qty: 30 TABLET | Refills: 1 | OUTPATIENT

## 2024-01-16 RX ORDER — LEVOTHYROXINE SODIUM 112 UG/1
1 CAPSULE IN THE MORNING ON AN EMPTY STOMACH CAPSULE ORAL ONCE A DAY
Refills: 0 | COMMUNITY
Start: 2020-03-19

## 2024-01-16 RX ORDER — RIVAROXABAN 15 MG/1
TABLET, FILM COATED ORAL ONCE A DAY
Refills: 0 | COMMUNITY
Start: 2021-03-29

## 2024-01-16 RX ORDER — MULTIVITAMIN
TABLET ORAL ONCE A DAY
Refills: 2 | COMMUNITY
Start: 2016-02-25

## 2024-01-16 NOTE — HPI-TODAY'S VISIT:
Arleth is a pleasant 88 year old female who presents today for a follow up.  History of A. fib currently on Xarelto.    Labs dated 12/6/2023 showed fecal leukocytes negative.  Normal renal function.  Normal LFTs.  TSH less than 0.015 (L).  Pancreatic elastase normal.  IgA 63 but no serologic evidence of celiac disease.  CRP normal.  Calprotectin normal.  C. difficile negative.  Remaining stool studies negative.  Labs dated 9/21/2023 showed a normal hemoglobin.  Normal platelets.  Creatinine 1.11, GFR 48.5.  Normal LFTs.  Ferritin normal.  Total iron normal.  CT abdomen/pelvis without contrast dated 2/3/2023 showed no acute abnormality in the abdomen or pelvis on noncontrast CT  CT abdomen/pelvis with contrast dated 5/13/2021 showed fecalized material in the distal ileum with mildly distended bowel loops suggesting hypomotility/stasis.  No overt evidence of obstruction.  No acute inflammatory changes.  Renal cortical atrophy and scarring.     EGD dated 9/12/2019 demonstrated a 1 cm hiatal hernia, irregular Z line, dilation to 18 mm with no evidence of strictures.  Mild antral gastritis.  Gastric mucosa demonstrated mild chronic inflammation, vascular congestion and foveolar hyperplasia.  Favor reactive/chemical gastritis but negative for H. pylori, metaplasia, dysplasia or neoplasia.  Distal esophageal biopsies demonstrated gastric type mucosa with mild chronic inflammation negative for goblet cells and negative for dysplasia.  Esophageal squamous mucosa showing reactive epithelial changes consistent with reflux    Colonoscopy dated 1/3/2020 demonstrated one diminutive mild hyperplastic polyp removed from the cecum.  One diminutive hyperplastic polyp was removed the mid transverse colon.  The entire examined colon appeared unremarkable but biopsied for histology.  Internal hemorrhoids were present upon retroflexion. Negative random colon biopsies.     MRI of the abdomen/pelvis dated 4/7/2020 demonstrated no suspicious masses identified in the abdomen.  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  Status post cholecystectomy and status post hysterectomy.  No pelvis mass is identified.   Barium swallow dated 5/27/2021 showed mild esophageal dysmotility.  No esophageal mass, hiatal hernia or gastroesophageal reflux disease.  Normal stomach without gastritis  Previously given trial of Xifaxan and noted improvement overall in her symptoms.  No longer with fecal incontinence. She never went to go see [facility] for biofeedback therapy. Notes no issues with constipation or diarrhea. Once a week she will have loose stools. Denies any nausea, vomiting, heartburn, reflux, dysphagia, odynophagia, hematemesis, coffee ground emesis, abdominal pain, abnormal weight loss, BRBPR or melena. Denies any family history of colon cancer but admits to maternal history colon polyps. Notes no other GI complaints at this time

## 2024-05-06 ENCOUNTER — TELEPHONE ENCOUNTER (OUTPATIENT)
Dept: URBAN - METROPOLITAN AREA CLINIC 64 | Facility: CLINIC | Age: 89
End: 2024-05-06

## 2024-07-24 ENCOUNTER — OFFICE VISIT (OUTPATIENT)
Dept: URBAN - METROPOLITAN AREA CLINIC 63 | Facility: CLINIC | Age: 89
End: 2024-07-24